# Patient Record
Sex: MALE | Race: BLACK OR AFRICAN AMERICAN | NOT HISPANIC OR LATINO | URBAN - METROPOLITAN AREA
[De-identification: names, ages, dates, MRNs, and addresses within clinical notes are randomized per-mention and may not be internally consistent; named-entity substitution may affect disease eponyms.]

---

## 2020-03-01 ENCOUNTER — EMERGENCY (EMERGENCY)
Facility: HOSPITAL | Age: 25
LOS: 1 days | Discharge: HOME | End: 2020-03-01
Attending: EMERGENCY MEDICINE | Admitting: EMERGENCY MEDICINE
Payer: COMMERCIAL

## 2020-03-01 VITALS
HEART RATE: 59 BPM | WEIGHT: 175.05 LBS | OXYGEN SATURATION: 97 % | DIASTOLIC BLOOD PRESSURE: 72 MMHG | RESPIRATION RATE: 19 BRPM | SYSTOLIC BLOOD PRESSURE: 138 MMHG | TEMPERATURE: 98 F | HEIGHT: 72 IN

## 2020-03-01 PROCEDURE — 99282 EMERGENCY DEPT VISIT SF MDM: CPT

## 2020-03-01 NOTE — ED PROVIDER NOTE - OBJECTIVE STATEMENT
23 yo M, otherwise healthy, p/w left knee pain after he hurt it a week ago while doing a self defense class. No direct fall or direct trauma, but pt thinks he may have twisted his knee. Had some initial swelling which has subsided. Took Ibuprofen with relief in pain. Now has pain only with certain movements. No limitations in walking. No other complaints or injuries.

## 2020-03-01 NOTE — ED PROVIDER NOTE - NSFOLLOWUPINSTRUCTIONS_ED_ALL_ED_FT
Please do not do any strenuous activity for the next 2 weeks. Take Ibuprofen as needed for pain.     Knee Pain    AMBULATORY CARE:    Knee pain may start suddenly, or it may be a long-term problem. You may have pain on the side, front, or back of your knee. You may have knee stiffness and swelling. You may hear popping sounds or feel like your knee is giving way or locking up as you walk. You may feel pain when you sit, stand, walk, or climb up and down stairs. Knee pain can be caused by conditions such as obesity, inflammation, or strains or tears in ligaments or tendons.    Seek care immediately if:     Your pain is worse, even after treatment.       You cannot bend or straighten your leg completely.       The swelling around your knee does not go down even with treatment.      Your knee is painful and hot to the touch.     Contact your healthcare provider if:     You have questions or concerns about your condition or care.         Treatment will depend on the cause of your pain. You may need any of the following:     NSAIDs help decrease swelling and pain or fever. This medicine is available with or without a doctor's order. NSAIDs can cause stomach bleeding or kidney problems in certain people. If you take blood thinner medicine, always ask your healthcare provider if NSAIDs are safe for you. Always read the medicine label and follow directions.      Acetaminophen decreases pain and fever. It is available without a doctor's order. Ask how much to take and how often to take it. Follow directions. Read the labels of all other medicines you are using to see if they also contain acetaminophen, or ask your doctor or pharmacist. Acetaminophen can cause liver damage if not taken correctly. Do not use more than 4 grams (4,000 milligrams) total of acetaminophen in one day.       Prescription pain medicine may be given. Ask your healthcare provider how to take this medicine safely. Some prescription pain medicines contain acetaminophen. Do not take other medicines that contain acetaminophen without talking to your healthcare provider. Too much acetaminophen may cause liver damage. Prescription pain medicine may cause constipation. Ask your healthcare provider how to prevent or treat constipation.       Steroid injections may be given into your knee. Steroids reduce inflammation and pain.      Surgery may be used for some injuries, such as to repair a torn ACL.    What you can do to manage your symptoms:     Rest your knee so it can heal. Limit activities that increase your pain. Do low-impact exercises, such as walking or swimming.       Apply ice to help reduce swelling and pain. Use an ice pack, or put crushed ice in a plastic bag. Cover it with a towel before you apply it to your knee. Apply ice for 15 to 20 minutes every hour, or as directed.      Apply compression to help reduce swelling. Use a brace or bandage only as directed.      Elevate your knee to help decrease pain and swelling. Elevate your knee while you are sitting or lying down. Prop your leg on pillows to keep your knee above the level of your heart.      Prevent your knee from moving as directed. Your healthcare provider may put on a cast or splint. You may need to wear a leg brace to stabilize your knee. A leg brace can be adjusted to increase your range of motion as your knee heals.Hinged Knee Braces          What you can do to prevent knee pain:     Maintain a healthy weight. Extra weight increases your risk for knee pain. Ask your healthcare provider how much you should weigh. He or she can help you create a safe weight loss plan if you need to lose weight.      Exercise or train properly. Use the correct equipment for sports. Wear shoes that provide good support. Check your posture often as you exercise, play sports, or train for an event. This can help prevent stress and strain on your knees. Rest between sessions so you do not overwork your knees.    Follow up with your healthcare provider within 24 hours or as directed: Write down your questions so you remember to ask them during your visits.        © Copyright [employer] 2020

## 2020-03-01 NOTE — ED PROVIDER NOTE - CARE PROVIDER_API CALL
Dante Westbrook)  Orthopaedic Surgery  [address] [place] [address], [address]  [place] [address]  Phone: [phone]  Fax: [phone]  Follow Up Time:     Neil Lafleur)  Orthopaedic Surgery  [address] [place] [address]  Phone: [phone]  Fax: [phone]  Follow Up Time:

## 2020-03-01 NOTE — ED PROVIDER NOTE - CLINICAL SUMMARY MEDICAL DECISION MAKING FREE TEXT BOX
23 yo M, otherwise healthy, p/w left knee pain after he hurt it a week ago while doing a self defense class. No direct fall or direct trauma, but pt thinks he may have twisted his knee. Had some initial swelling which has subsided. Took Ibuprofen with relief in pain. Now has pain only with certain movements. No limitations in walking. No other complaints or injuries. Exam with mild pain with external rotation of knee, otherwise normal. No imaging indicated at this time and this was explained to pt. Advised rest and OTC pain meds prn pain.

## 2020-03-01 NOTE — ED ADULT NURSE NOTE - OBJECTIVE STATEMENT
Pt came to ED complaining of left knee pain x1 week after strenuous martial arts training. No deformities, numbness, bleeding, swelling found at this time. Pt states he wanted an xray. Pt denies all other medical complaints at this time. Pt is alert and oriented x3. Positive PMS x4 extremities, ambulatory with even gait.

## 2020-03-01 NOTE — ED PROVIDER NOTE - PATIENT PORTAL LINK FT
You can access the FollowMyHealth Patient Portal offered by [facility] by registering at the following website: [email or web address]. By joining [employer]’s FollowMyHealth portal, you will also be able to view your health information using other applications (apps) compatible with our system.

## 2020-03-01 NOTE — ED ADULT NURSE NOTE - CHPI ED NUR SYMPTOMS NEG
no abrasion/no stiffness/no numbness/no deformity/no bruising/no fever/no tingling/no back pain/no weakness

## 2020-03-01 NOTE — ED PROVIDER NOTE - MUSCULOSKELETAL, MLM
left knee with FROM, no swelling, no TTP, some pain with external rotation of knee, otherwise normal exam, rest of extremity exam WNL.

## 2020-03-07 DIAGNOSIS — M25.562 PAIN IN LEFT KNEE: ICD-10-CM

## 2020-03-07 DIAGNOSIS — M25.662 STIFFNESS OF LEFT KNEE, NOT ELSEWHERE CLASSIFIED: ICD-10-CM

## 2021-04-21 PROBLEM — Z78.9 OTHER SPECIFIED HEALTH STATUS: Chronic | Status: ACTIVE | Noted: 2020-03-02

## 2021-04-26 PROBLEM — Z00.00 ENCOUNTER FOR PREVENTIVE HEALTH EXAMINATION: Status: ACTIVE | Noted: 2021-04-26

## 2021-04-27 ENCOUNTER — APPOINTMENT (OUTPATIENT)
Dept: OTOLARYNGOLOGY | Facility: CLINIC | Age: 26
End: 2021-04-27
Payer: COMMERCIAL

## 2021-04-27 VITALS
WEIGHT: 175 LBS | HEART RATE: 56 BPM | DIASTOLIC BLOOD PRESSURE: 73 MMHG | BODY MASS INDEX: 23.7 KG/M2 | HEIGHT: 72 IN | SYSTOLIC BLOOD PRESSURE: 138 MMHG | TEMPERATURE: 97.9 F

## 2021-04-27 DIAGNOSIS — Z78.9 OTHER SPECIFIED HEALTH STATUS: ICD-10-CM

## 2021-04-27 PROCEDURE — 99204 OFFICE O/P NEW MOD 45 MIN: CPT | Mod: 25

## 2021-04-27 PROCEDURE — 31231 NASAL ENDOSCOPY DX: CPT

## 2021-04-27 PROCEDURE — 99072 ADDL SUPL MATRL&STAF TM PHE: CPT

## 2021-04-27 RX ORDER — FLUTICASONE PROPIONATE 50 UG/1
50 SPRAY, METERED NASAL DAILY
Qty: 1 | Refills: 5 | Status: ACTIVE | COMMUNITY
Start: 2021-04-27 | End: 1900-01-01

## 2021-04-27 NOTE — CONSULT LETTER
[Dear  ___] : Dear  [unfilled], [Courtesy Letter:] : I had the pleasure of seeing your patient, [unfilled], in my office today. [Consult Closing:] : Thank you very much for allowing me to participate in the care of this patient.  If you have any questions, please do not hesitate to contact me. [Sincerely,] : Sincerely, [FreeTextEntry3] : Rojas Mcnamara MD\par Department of Otolaryngology - Head and Neck Surgery\par [facility]

## 2021-04-27 NOTE — ASSESSMENT
[FreeTextEntry1] : 25M here for initial evaluation. For over the past year, at least, he c/o difficulty breathing from his nose with 'heavy, constricted' breathing. No matter how hard he blows, there is always congestion. The congestion affects both sides equally and usually alternates between them. He also thinks his nose looks crooked. He has known seasonal allergies (cats, dust, pollen) and takes daily zyrtec; he is not on any nasal sprays. On exam, nasal endoscopy shows moderately severe right>left inferior turbinate hypertrophy with leftward septal deflection and\par scattered clear thick mucus;  the turbinates decongest well w improvement in breathing.\par His nasal obstruction primarily due to turbinate hypertrophy/rhinitis which is most likely allergic given history. There is also leftward septal deflection as well. To start daily nasal steroid and get CT and RTO 1 month. Should he remain symptomatic, would offer septoplasty/turbinate reduction (pending CT). This was all discussed and all questions.

## 2021-04-27 NOTE — HISTORY OF PRESENT ILLNESS
[de-identified] : 25M here for initial evaluation.\par \par For over the past year, at least, he c/o difficulty breathing from his nose with 'heavy, constricted' breathing. No matter how hard he blows, there is always congestion. Congestion affects both sides equally and usually alternates between sides. He also thinks his nose looks crooked. \par He has known seasonal allergies (cats, dust, pollen) and takes daily zyrtec; he is not on any nasal sprays. \par There is no h/o sinusitis.\par No imaging.\par \par ROS otherwise unremarkable.

## 2021-04-27 NOTE — PROCEDURE
[FreeTextEntry3] : Nasal Endoscopy:\par moderately severe right>left inferior turbinate hypertrophy --> decongest well w improvement in breathing\par leftward septal deflection\par scattered clear thick mucus

## 2021-05-06 ENCOUNTER — RESULT REVIEW (OUTPATIENT)
Age: 26
End: 2021-05-06

## 2021-05-06 ENCOUNTER — APPOINTMENT (OUTPATIENT)
Dept: CT IMAGING | Facility: HOSPITAL | Age: 26
End: 2021-05-06
Payer: COMMERCIAL

## 2021-05-06 ENCOUNTER — OUTPATIENT (OUTPATIENT)
Dept: OUTPATIENT SERVICES | Facility: HOSPITAL | Age: 26
LOS: 1 days | End: 2021-05-06
Payer: COMMERCIAL

## 2021-05-06 PROCEDURE — 70486 CT MAXILLOFACIAL W/O DYE: CPT

## 2021-05-06 PROCEDURE — 70486 CT MAXILLOFACIAL W/O DYE: CPT | Mod: 26

## 2021-06-04 ENCOUNTER — APPOINTMENT (OUTPATIENT)
Dept: OTOLARYNGOLOGY | Facility: CLINIC | Age: 26
End: 2021-06-04

## 2021-07-23 ENCOUNTER — APPOINTMENT (OUTPATIENT)
Dept: OTOLARYNGOLOGY | Facility: CLINIC | Age: 26
End: 2021-07-23
Payer: COMMERCIAL

## 2021-07-23 VITALS
HEIGHT: 72 IN | SYSTOLIC BLOOD PRESSURE: 114 MMHG | TEMPERATURE: 97 F | BODY MASS INDEX: 23.7 KG/M2 | HEART RATE: 59 BPM | WEIGHT: 175 LBS | DIASTOLIC BLOOD PRESSURE: 62 MMHG

## 2021-07-23 DIAGNOSIS — J34.89 OTHER SPECIFIED DISORDERS OF NOSE AND NASAL SINUSES: ICD-10-CM

## 2021-07-23 DIAGNOSIS — J34.3 HYPERTROPHY OF NASAL TURBINATES: ICD-10-CM

## 2021-07-23 PROCEDURE — 99213 OFFICE O/P EST LOW 20 MIN: CPT

## 2021-07-23 PROCEDURE — 99072 ADDL SUPL MATRL&STAF TM PHE: CPT

## 2021-07-23 NOTE — ASSESSMENT
[FreeTextEntry1] : 26M here in followup. Since last seen, he has tried the nasal steroid sprays but remains symptomatic. For over the past year, at least, he c/o difficulty breathing from his nose with 'heavy, constricted' breathing. No matter how hard he blows, there is always congestion. Congestion affects both sides equally and usually alternates between sides. He also thinks his nose looks crooked. He has known seasonal allergies (cats, dust, pollen) and takes daily zyrtec; he is not on any nasal sprays. CT sinus shows leftward septal bowing with bony septal spur that contacts the left inferior turbinate with generalized mucosal thickening throughout the nasal cavity suggestive of rhinitis. On exam, nasal endoscopy shows moderately severe right>left inferior turbinate hypertrophy with leftward septal deflection and scattered clear thick mucus;  the turbinates decongest well w improvement in breathing.\par His nasal obstruction primarily due to turbinate hypertrophy/rhinitis which is most likely allergic given history. There is also leftward septal deflection as well. There is no CT e/o sinusitis either. For now, I offered septoplasty/turbinate reduction to improve nasal breathing - it was discussed at length and he wants to think about it and let me know should he want to proceed. Until then, continue nasal steroid sprays.

## 2021-07-23 NOTE — HISTORY OF PRESENT ILLNESS
[de-identified] : 26M here in followup.\par \par Since last seen, he has tried the nasal steroid sprays but remains symptomatic. For over the past year, at least, he c/o difficulty breathing from his nose with 'heavy, constricted' breathing. No matter how hard he blows, there is always congestion. Congestion affects both sides equally and usually alternates between sides. He also thinks his nose looks crooked. \par He has known seasonal allergies (cats, dust, pollen) and takes daily zyrtec; he is not on any nasal sprays. \par There is no h/o sinusitis.\par \par CT Sinus 5/6/21 (I reviewed images):\par -leftward septal bowing with bony septal spur that contacts the left inferior turbinate.\par -generalized mucosal thickening throughout the nasal cavity suggestive of rhinitis.\par -variation in ethmoid anatomy results in narrowing of anterior drainage pathways\par \par ROS otherwise unremarkable.

## 2021-07-23 NOTE — CONSULT LETTER
[Dear  ___] : Dear  [unfilled], [Courtesy Letter:] : I had the pleasure of seeing your patient, [unfilled], in my office today. [Consult Closing:] : Thank you very much for allowing me to participate in the care of this patient.  If you have any questions, please do not hesitate to contact me. [Sincerely,] : Sincerely, [FreeTextEntry3] : Rojas Mcnamara MD\par Department of Otolaryngology - Head and Neck Surgery\par [facility]

## 2021-07-23 NOTE — DATA REVIEWED
[de-identified] : CT sinus 5/6/21:\par Findings:\par \par *  Prior Surgery:No CT evidence of previous sinus surgery.\par \par *  Sinuses: Well-developed and fully ventilated.\par \par *  Sinocranial and Sino-orbital Junctions: Intact.\par \par *  Ostiomeatal Units: Patent, though narrowed by pneumatization of the orbital ethmoid plate.\par \par *  Frontal Sinus Outflow Tracts: Patent, though narrowed on the left related to accessory anterior frontoethmoidal cell pneumatization.\par \par *  Sphenoethmoidal Recesses: Clear.\par \par *  Maxillary Teeth: No evidence of contributory odontogenic inflammation.\par \par *  Nasal Cavity/Nasopharynx: Leftward septal bowing with bony septal spur that contacts the left inferior turbinate. There is generalized mucosal thickening throughout the nasal cavity suggestive of rhinitis. There is no discrete nasal or nasopharyngeal soft tissue mass.\par \par *  Orbits: Normal.\par \par *  Brain:  No hydrocephalus or large intracranial mass lesion.\par \par *  Mastoids: Clear.\par \par \par IMPRESSION:\par \par While there is currently no evidence of active sinus inflammatory disease, variation in ethmoid anatomy results in narrowing of anterior drainage pathways, as above. There is septal deviation, a bony septal spur, and CT evidence of rhinitis.\par \par

## 2021-07-25 ENCOUNTER — TRANSCRIPTION ENCOUNTER (OUTPATIENT)
Age: 26
End: 2021-07-25
